# Patient Record
(demographics unavailable — no encounter records)

---

## 2017-02-16 NOTE — PHYS DOC
Past Medical History


Past Medical History:  No Pertinent History


Past Surgical History:  Tonsillectomy, Other


Additional Past Surgical Histo:  left foot


Additional Information:  


nonsmoker


Alcohol Use:  Occasionally


Drug Use:  Marijuana





Adult General


Chief Complaint


Chief Complaint:  COUGH





HPI


HPI


Patient is a 25  year old male who presents with productive cough and shortness 

of breath for 3 weeks. He reports chest pain with his cough only. He also has 

nasal congestion and sore throat. He denies fever, ear pain, vomiting, or 

diarrhea. The patient was seen previously at [facility] for the same 

symptoms. He was diagnosed with bronchitis and prescribed an inhaler. He states 

that the inhaler is not helping and he continues to feel poorly. He did not 

receive a flu shot this year. He does not have a PCP.





Review of Systems


Review of Systems


Constitutional: Denies fever or chills. []


Eyes: Denies change in visual acuity, redness, or eye pain. []


HENT: Denies ear pain. Reports nasal congestion and sore throat.


Respiratory: Reports productive cough and shortness of breath.


Cardiovascular: Denies palpitations or edema. Reports chest pain with cough 

only.


GI: Denies abdominal pain, nausea, vomiting, bloody stools or diarrhea. []


Musculoskeletal: Denies back pain or joint pain. []


Integument: Denies rash or skin lesions. []


Neurologic: Denies headache, focal weakness or sensory changes. []





All systems reviewed and negative unless otherwise stated in the HPI.





Allergies


Allergies





 Allergies








Coded Allergies Type Severity Reaction Last Updated Verified


 


  No Known Drug Allergies    5/1/16 No











Physical Exam


Physical Exam





Constitutional: Well developed, well nourished, no acute distress, non-toxic 

appearance. []


HENT: Normocephalic, atraumatic, bilateral external ears normal, oropharynx 

moist, no oral exudates, nose normal. Bilateral TMs without erythema or 

bulging. There is no posterior pharyngeal erythema or tonsillar edema. 

Bilateral nasal turbinates are swollen and erythematous with purulent drainage.


Eyes: PERRLA, EOMI, conjunctiva normal, no discharge. [] 


Neck: Normal range of motion, no tenderness, supple, no stridor. [] 


Cardiovascular: Heart rate regular rhythm, no murmur []


Lungs & Thorax:  Bilateral breath sounds clear to auscultation without wheezes, 

rales, or rhonchi.


Skin: Warm, dry, no erythema, no rash. [] 


Neurologic: Alert and oriented X 3, normal motor function, normal sensory 

function, no focal deficits noted. []


Psychologic: Affect normal, judgement normal, mood normal. []





Current Patient Data


Vital Signs





 Vital Signs








  Date Time  Temp Pulse Resp B/P Pulse Ox O2 Delivery O2 Flow Rate FiO2


 


2/16/17 11:54 97.6 75 18  100 Room Air  





 97.6       











EKG


EKG


[]





Radiology/Procedures


Radiology/Procedures


REASON: cough x3 weeks


PROCEDURE: CHEST PA & LATERAL





Chest, 2 views, 2/16/2017:





History: Chest pain





The heart size and pulmonary vascularity are normal. The lungs are clear.


There is no evidence of pleural fluid.





IMPRESSION: No acute cardiopulmonary abnormality is detected.





Course & Med Decision Making


Course & Med Decision Making


Pertinent Labs and Imaging studies reviewed. (See chart for details)





[]





Dragon Disclaimer


Dragon Disclaimer


This electronic medical record was generated, in whole or in part, using a 

voice recognition dictation system.





Departure


Departure


Impression:  


 Primary Impression:  


 Bronchitis


Disposition:  01 HOME, SELF-CARE


Condition:  STABLE


Referrals:  


NO PCP (PCP)


Patient Instructions:  Acute Bronchitis, Easy-to-Read





Additional Instructions:


Your chest x-ray does not show any pneumonia. You appear to have bronchitis, 

which is usually viral and does not respond to antibiotics.


Please complete all the prescribed steroids, even if you are feeling better.


Please use the prescribed inhaler as needed for cough or shortness of breath. 

Do not use more often than directed.


Please follow-up with a primary care doctor if your symptoms continue.


Return to the emergency department with any new or concerning symptoms.


Scripts


Benzonatate 200 Mg Capsule1 Cap PO TID #30 CAP


   Prov:ANGELA LEON         2/16/17


Prednisone 20 Mg [id number] Mg PO DAILY 5 Days


   Prov:ANGELA LEON         2/16/17


Albuterol Sulfate (Proair Hfa Inhaler)8.5 Gm Hfa.aer.ad1 Puff INH Q4HRS PRN 

SHORTNESS OF BREATH #1 INHALER


   Prov:ANGELA LEON         2/16/17








ANGELA LEON Feb 16, 2017 13:19

## 2017-02-16 NOTE — RAD
Chest, 2 views, 2/16/2017:



History: Chest pain



The heart size and pulmonary vascularity are normal. The lungs are clear.

There is no evidence of pleural fluid.



IMPRESSION: No acute cardiopulmonary abnormality is detected.

## 2017-03-14 NOTE — PHYS DOC
Past Medical History


Past Medical History:  No Pertinent History


Past Surgical History:  Tonsillectomy


Additional Past Surgical Histo:  left foot


Alcohol Use:  Occasionally


Drug Use:  Marijuana





Adult General


Chief Complaint


Chief Complaint:  NAUSEA/VOMITING/DIARRHA





HPI


HPI


25-year-old male presenting to the emergency department today with nausea 

vomiting and watery diarrhea. This been present for 4 days. He is also had a 

cough rhinorrhea and muscle aches. He denies high fevers but has had chills at 

home. He has been taking over-the-counter medications with mild relief. Onset 4 

days. Intermittent. Worse at night.





Review of systems is negative for chest pain shortness of breath. Positive for 

cough nausea and muscle aches. All other review of systems is negative unless 

otherwise noted in history of present illness.





Review of Systems


Review of Systems


SEE ABOVE.





Current Medications


Current Medications





 Current Medications








 Medications


  (Trade)  Dose


 Ordered  Sig/Fernando  Start Time


 Stop Time Status Last Admin


Dose Admin


 


 Dicyclomine HCl


  (Bentyl)  10 mg  1X  ONCE  3/14/17 06:15


 3/14/17 06:16   


 


 


 Ondansetron HCl


  (Zofran Odt)  4 mg  1X  ONCE  3/14/17 06:15


 3/14/17 06:16  3/14/17 06:08


4 MG











Allergies


Allergies





 Allergies








Coded Allergies Type Severity Reaction Last Updated Verified


 


  No Known Drug Allergies    5/1/16 No











Physical Exam


Physical Exam





Constitutional: Well developed, well nourished, no acute distress, non-toxic 

appearance. 


HENT: Normocephalic, atraumatic, bilateral external ears normal, oropharynx 

moist, no oral exudates, nose normal. 


Eyes: PERRLA, EOMI, conjunctiva normal, no discharge. [] 


Neck: Normal range of motion, no tenderness, supple, no stridor.  


Cardiovascular:Heart rate regular rhythm, no murmur []


Lungs & Thorax:  Bilateral breath sounds clear to auscultation 


Abdomen: Soft nontender abdomen without rebound tenderness or guarding present. 

Negative McBurneys point. Negative Aleman sign. No ecchymosis present.


Skin: Warm, dry, no erythema, no rash.  


Back: No tenderness, no CVA tenderness. [] 


Extremities: No tenderness, no cyanosis, no clubbing, ROM intact, no edema. [] 


Neurologic: Alert and oriented X 3, normal motor function, normal sensory 

function, no focal deficits noted. []


Psychologic: Affect normal, judgement normal, mood normal. []





Current Patient Data


Vital Signs





 Vital Signs








  Date Time  Temp Pulse Resp B/P Pulse Ox O2 Delivery O2 Flow Rate FiO2


 


3/14/17 05:38 97.6 106 16  99 Room Air  





 97.6       


 


3/14/17 05:38    149/81    











EKG


EKG


[]





Radiology/Procedures


Radiology/Procedures


[]





Course & Med Decision Making


Course & Med Decision Making


Pertinent Labs and Imaging studies reviewed. (See chart for details)





25-year-old male presenting to the emergency department with nausea vomiting 

watery diarrhea. Patient was mildly tachycardic in the emergency department 

however had moist because membranes and clinically appeared to be euvolemic. 

Pertinent physical exam findings nontender abdomen. The patient was given 

Zofran and Bentyl in the emergency department. He was subsequent discharged 

home with symptomatic care. Face-to-face discharge instructions were given. 

Return precautions given. Patient comfortable plan. Follow up with PCP in 2-3 

days if his symptoms did not improve. Work note provided.





Dragon Disclaimer


Dragon Disclaimer


This electronic medical record was generated, in whole or in part, using a 

voice recognition dictation system.





Departure


Departure


Impression:  


 Primary Impression:  


 Nausea & vomiting


 Additional Impression:  


 Diarrhea


Disposition:  01 HOME, SELF-CARE


Condition:  STABLE


Referrals:  


NO PCP (PCP)








ANANT MAE MD


Patient Instructions:  Nausea and Vomiting





Additional Instructions:


Thank you for allowing us to participate in your care today.





Followup with your primary care physician in 3 days if your symptoms do not 

improve. If you do not have a primary care provider you can ask for a list of 

our primary care providers. Return to the emergency department you have any new 

or concerning findings.





This should be evaluated by the primary care physician and any necessary 

consulting services for continued management within a few days after discharge. 

Return to emergency room if you have any  new or concerning symptoms including 

but not limited to fever, chills, nausea, vomiting, intractable pain, any new 

rashes, chest pain, shortness of air, uncontrolled bleeding, difficulty 

breathing, and/or vision loss.





You may have been prescribed medication that can change in your level of 

thinking and ability to operate machinery. These medications include 

hydrocodone and Ativan. Also, Benadryl has been known to do this as well. Be 

sure to check with your pharmacist and ask if the medications you've prescribed 

can affect your level of consciousness. I recommend not operating heavy 

machinery or driving while on medication such as these.


Scripts


Ondansetron (Zofran Odt)4 Mg Tab.rapdis1 Tab SL PRN Q8HRS PRN NAUSEA #6 TAB


   Prov:RENNY HERNANDEZ MD         3/14/17





Problem Qualifiers








RENNY HERNANDEZ MD Mar 14, 2017 06:19

## 2017-05-10 NOTE — PHYS DOC
Past Medical History


Past Medical History:  No Pertinent History


Past Surgical History:  Tonsillectomy


Additional Past Surgical Histo:  left foot


Alcohol Use:  Occasionally


Drug Use:  Marijuana





Adult General


Chief Complaint


Chief Complaint:  ABDOMINAL PAIN





HPI


HPI





Patient is a 25  year old male presenting to the emergency department for 

evaluation of epigastric and left upper quadrant pain that has been going on 

for at least 1 year.  Sharp sometimes burning and is worse after eating food 

but can happen at any time.  His father has an ostomy for diverticulitis and is 

afraid that he to have this condition as well.  Patient says that spicy foods 

make his pain worse and he has some nausea with it but no fevers chills 

vomiting diarrhea constipation dysuria hematuria or testicular pain. Denies any 

prior abdominal surgeries and he is in no obvious distress with normal vital 

signs.





Review of Systems


Review of Systems





Constitutional: Denies fever or chills []


Eyes: Denies change in visual acuity, redness, or eye pain []


HENT: Denies nasal congestion or sore throat []


Respiratory: Denies cough or shortness of breath []


Cardiovascular: No additional information not addressed in HPI []


GI: + abdominal pain, nausea.  No vomiting, bloody stools or diarrhea []


: Denies dysuria or hematuria []


Musculoskeletal: Denies back pain or joint pain []





Allergies


Allergies





Allergies








Coded Allergies Type Severity Reaction Last Updated Verified


 


  No Known Drug Allergies    5/1/16 No











Physical Exam


Physical Exam





Constitutional: Well developed, well nourished, no acute distress, non-toxic 

appearance. []


HENT: Normocephalic, atraumatic, bilateral external ears normal, oropharynx 

moist, no oral exudates, nose normal. []


Eyes: PERRLA, EOMI, conjunctiva normal, no discharge. [] 


Neck: Normal range of motion, no tenderness, supple, no stridor. [] 


Cardiovascular:Heart rate regular rhythm, no murmur []


Lungs & Thorax:  Bilateral breath sounds clear to auscultation []


Abdomen: Bowel sounds normal, soft, + mild epigastric tenderness, no rebound or 

guarding. no masses, no pulsatile masses. []





Current Patient Data


Vital Signs





 Vital Signs








  Date Time  Temp Pulse Resp B/P (MAP) Pulse Ox O2 Delivery O2 Flow Rate FiO2


 


5/10/17 14:22 97.6 78 18 186/91 (122) 99 Room Air  





 97.6       








Lab Values





 Laboratory Tests








Test


  5/10/17


15:10


 


White Blood Count


  9.4 x10^3/uL


(4.0-11.0)


 


Red Blood Count


  5.24 x10^6/uL


(4.30-5.70)


 


Hemoglobin


  15.9 g/dL


(13.0-17.5)


 


Hematocrit


  46.2 %


(39.0-53.0)


 


Mean Corpuscular Volume


  88 fL ()


 


 


Mean Corpuscular Hemoglobin 30 pg (25-35)  


 


Mean Corpuscular Hemoglobin


Concent 35 g/dL


(31-37)


 


Red Cell Distribution Width


  14.7 %


(11.5-14.5)  H


 


Platelet Count


  290 x10^3/uL


(140-400)


 


Neutrophils (%) (Auto) 65 % (31-73)  


 


Lymphocytes (%) (Auto) 24 % (24-48)  


 


Monocytes (%) (Auto) 6 % (0-9)  


 


Eosinophils (%) (Auto) 4 % (0-3)  H


 


Basophils (%) (Auto) 1 % (0-3)  


 


Neutrophils # (Auto)


  6.1 x10^3uL


(1.8-7.7)


 


Lymphocytes # (Auto)


  2.3 x10^3/uL


(1.0-4.8)


 


Monocytes # (Auto)


  0.6 x10^3/uL


(0.0-1.1)


 


Eosinophils # (Auto)


  0.4 x10^3/uL


(0.0-0.7)


 


Basophils # (Auto)


  0.1 x10^3/uL


(0.0-0.2)


 


Sodium Level


  141 mmol/L


(136-145)


 


Potassium Level


  4.8 mmol/L


(3.5-5.1)


 


Chloride Level


  106 mmol/L


()


 


Carbon Dioxide Level


  30 mmol/L


(21-32)


 


Anion Gap 5 (6-14)  L


 


Blood Urea Nitrogen


  17 mg/dL


(8-26)


 


Creatinine


  1.1 mg/dL


(0.7-1.3)


 


Estimated GFR


(Cockcroft-Gault) 81.6  


 


 


BUN/Creatinine Ratio 15 (6-20)  


 


Glucose Level


  96 mg/dL


(70-99)


 


Calcium Level


  9.1 mg/dL


(8.5-10.1)


 


Total Bilirubin


  0.5 mg/dL


(0.2-1.0)


 


Aspartate Amino Transferase


(AST) 25 U/L (15-37)


 


 


Alanine Aminotransferase (ALT)


  64 U/L (16-63)


H


 


Alkaline Phosphatase


  91 U/L


()


 


Total Protein


  8.1 g/dL


(6.4-8.2)


 


Albumin


  3.7 g/dL


(3.4-5.0)


 


Albumin/Globulin Ratio


  0.8 (1.0-1.7)


L


 


Lipase


  62 U/L


()  L





 Laboratory Tests


5/10/17 15:10








 Laboratory Tests


5/10/17 15:10














EKG


EKG


[]





Radiology/Procedures


Radiology/Procedures


[]





Course & Med Decision Making


Course & Med Decision Making


Labs are quite unremarkable and he has repeat benign abdominal exam.  I suspect 

he has gastritis although an ulcer is a consideration as well.  He admits that 

he drinks daily and is quite poorly psych told him to improve his diet and stop 

drinking alcohol.  I also told to follow with a primary care provider and/or GI 

physician within the next 1-2 weeks and come back to the ER sooner with any 

worsening pain fevers vomiting or other general concerns.  Patient aware and 

agreeable with the above plan and verbalized understanding.





Dragon Disclaimer


Dragon Disclaimer


This electronic medical record was generated, in whole or in part, using a 

voice recognition dictation system.





Departure


Departure


Impression:  


 Primary Impression:  


 Abdominal pain


Disposition:  01 HOME, SELF-CARE


Condition:  GOOD


Referrals:  


RAMILA HERNANDEZ MD


Patient Instructions:  Abdominal Pain (Nonspecific)





Additional Instructions:  


AVOID EATING SPICY OR IRRITATING FOODS.  TAKE THE PRILOSEC DAILY AND THE NORCO 

IS FOR BREAKTHROUGH PAIN.


Scripts


Ondansetron (ZOFRAN ODT) 4 Mg Tab.rapdis


4 MG PO BID Y for NAUSEA/VOMITING, #10 TAB


   Prov: ANNIA PAPPAS DO         5/10/17 


Hydrocodone/Apap 5-325 (NORCO 5-325 TABLET) 1 Each Tablet


1 TAB PO PRN Q6HRS Y for PAIN, #10 TAB 0 Refills


   Prov: ANNIA PAPPAS DO         5/10/17 


Omeprazole Magnesium (PRILOSEC OTC) 20 Mg Tablet.dr


40 MG PO DAILY, #30 TAB


   Prov: ANNIA PAPPAS DO         5/10/17





Problem Qualifiers








 Primary Impression:  


 Abdominal pain


 Abdominal location:  epigastric  Qualified Codes:  R10.13 - Epigastric pain








ANNIA PAPPAS DO May 10, 2017 16:02